# Patient Record
Sex: FEMALE | Race: WHITE | NOT HISPANIC OR LATINO | Employment: UNEMPLOYED | URBAN - NONMETROPOLITAN AREA
[De-identification: names, ages, dates, MRNs, and addresses within clinical notes are randomized per-mention and may not be internally consistent; named-entity substitution may affect disease eponyms.]

---

## 2023-05-30 ENCOUNTER — TELEPHONE (OUTPATIENT)
Dept: PRIMARY CARE | Facility: CLINIC | Age: 58
End: 2023-05-30

## 2023-05-30 NOTE — TELEPHONE ENCOUNTER
----- Message from Jocelin Love DO sent at 5/29/2023  9:11 PM EDT -----  Mammogram is normal.  Recheck in 1 year.     ----- Message -----  From: Chel Kellogg MA  Sent: 5/25/2023   3:16 PM EDT  To: Jocelin Love DO

## 2024-05-20 ENCOUNTER — TELEPHONE (OUTPATIENT)
Dept: PRIMARY CARE | Facility: CLINIC | Age: 59
End: 2024-05-20
Payer: COMMERCIAL

## 2024-05-20 DIAGNOSIS — T75.3XXD MOTION SICKNESS, SUBSEQUENT ENCOUNTER: ICD-10-CM

## 2024-05-20 DIAGNOSIS — Z00.00 HEALTHCARE MAINTENANCE: Primary | ICD-10-CM

## 2024-05-20 RX ORDER — SCOLOPAMINE TRANSDERMAL SYSTEM 1 MG/1
1 PATCH, EXTENDED RELEASE TRANSDERMAL
Qty: 10 PATCH | Refills: 0 | Status: SHIPPED | OUTPATIENT
Start: 2024-05-20 | End: 2024-07-19

## 2024-05-20 NOTE — TELEPHONE ENCOUNTER
"Pt has a trip planned for [facility] in late June. Pt will be taking 2 ferry trips. Pt states she gets \"sea sick\". Can you call her something in for the nausea. Pt uses [facility] in [facility].    Pt has not been seen since 2022. Would you like to see before you are on leave?  "

## 2024-05-20 NOTE — TELEPHONE ENCOUNTER
Sent in scopolamine patch for seasickness     Pls set her up for cpe in nov or dec labs ordered for before (have done a few days in advance)

## 2024-12-02 ENCOUNTER — APPOINTMENT (OUTPATIENT)
Dept: PRIMARY CARE | Facility: CLINIC | Age: 59
End: 2024-12-02
Payer: COMMERCIAL

## 2024-12-02 VITALS
SYSTOLIC BLOOD PRESSURE: 129 MMHG | HEART RATE: 60 BPM | OXYGEN SATURATION: 97 % | HEIGHT: 63 IN | DIASTOLIC BLOOD PRESSURE: 80 MMHG | TEMPERATURE: 97.4 F | WEIGHT: 130.6 LBS | BODY MASS INDEX: 23.14 KG/M2

## 2024-12-02 DIAGNOSIS — Z11.59 NEED FOR HEPATITIS C SCREENING TEST: ICD-10-CM

## 2024-12-02 DIAGNOSIS — Z23 IMMUNIZATION DUE: ICD-10-CM

## 2024-12-02 DIAGNOSIS — Z00.00 HEALTHCARE MAINTENANCE: Primary | ICD-10-CM

## 2024-12-02 DIAGNOSIS — E55.9 VITAMIN D DEFICIENCY: ICD-10-CM

## 2024-12-02 PROBLEM — R76.8 ANA POSITIVE: Status: ACTIVE | Noted: 2024-12-02

## 2024-12-02 PROBLEM — R20.0 NUMBNESS AND TINGLING: Status: RESOLVED | Noted: 2024-12-02 | Resolved: 2024-12-02

## 2024-12-02 PROBLEM — R41.3 AMNESIA: Status: RESOLVED | Noted: 2024-12-02 | Resolved: 2024-12-02

## 2024-12-02 PROBLEM — R20.2 NUMBNESS AND TINGLING: Status: RESOLVED | Noted: 2024-12-02 | Resolved: 2024-12-02

## 2024-12-02 PROBLEM — H00.019 HORDEOLUM EXTERNUM OF UPPER EYELID: Status: RESOLVED | Noted: 2024-12-02 | Resolved: 2024-12-02

## 2024-12-02 PROBLEM — N76.0 VAGINITIS: Status: RESOLVED | Noted: 2024-12-02 | Resolved: 2024-12-02

## 2024-12-02 PROCEDURE — 90715 TDAP VACCINE 7 YRS/> IM: CPT | Performed by: FAMILY MEDICINE

## 2024-12-02 PROCEDURE — 1036F TOBACCO NON-USER: CPT | Performed by: FAMILY MEDICINE

## 2024-12-02 PROCEDURE — 90471 IMMUNIZATION ADMIN: CPT | Performed by: FAMILY MEDICINE

## 2024-12-02 PROCEDURE — 3008F BODY MASS INDEX DOCD: CPT | Performed by: FAMILY MEDICINE

## 2024-12-02 PROCEDURE — 99396 PREV VISIT EST AGE 40-64: CPT | Performed by: FAMILY MEDICINE

## 2024-12-02 NOTE — ASSESSMENT & PLAN NOTE
Vaccines and screenings reviewed.  Questionnaires completed.  Health and wellness topics reviewed.  Diet and exercise recommendations revisited.  Routine blood work ordered today - to be done at patient's earliest convenience FASTING.    VACCINES:  -Flu vaccine is utd  -TDAP is recommended every 10 years, due per our records, updated in office today, good until 2034  -Shingles vaccine (Shingrix) is recommended. Please call insurance to see if covered. This vaccine is expensive but extremely effective. This is to be administered in 2 separate doses, 2- 6 months apart. This is a killed virus vaccine, so no risk of chicken pox or shingles with administration. The vaccine is approximately 90 % effective to protect against shingles even 5 years out. Side effects of the vaccine can include soreness of the arm at administration site and possible flu-like symptoms after administration. This is a strongly recommended vaccine.     SCREENINGS:  -Screening pap is utd 1/2024 via GYN  -Screening mammogram is utd 5/2024, repeat due 5/2025  -Screening colonoscopy is utd 12/2022, repeat as per GI  -Screening dexa recommended for ages 65+    LIFESTYLE MEASURES  -consider increasing protein intake provided no issues with kidneys to 1 gram per 1 pound of ideal body weight per not to exceed 150 gram per day. May have to supplement with a protein powder to achieve this goal.  -make sure you are avoiding refined carbs such as breads, pasta, cereal, candy, soda,  nutrition bars, granola, chips, and sugar sweetened beverages.      -eat 5- 7 servings daily of veggies,  healthy protein such as chicken, fish,  beans, and eggs, and include healthy fats in your diet such as seeds, nuts, olive oil, avocados, and salmon.   -exercise 4 - 6 days per week as you are able, 150 minutes total weekly divided up is recommended with 3-4 of those days including resistance/strength training.  -Vitamin D is recommended at 1000 - 5000 IU international units  daily.   -Always wear sunscreen when you have sun exposure.  -64 oz of water is recommended daily.  -Dental visits recommended every 6 months.  -Eye exam recommended every 2 years, for those with vision problems every year.

## 2024-12-02 NOTE — PROGRESS NOTES
Subjective   Patient ID: Aysha Salter is a 58 y.o. female who presents for Annual Exam (Pt presents for annual physical exam- ABN was given to pt and signed, pt verbalized understanding. Pt states no concerns/questions at this time. ).    HPI     Patient presents today for annual physical.  Patient is doing well overall, they have no new concerns or issues.     Reports still keeping logs of episodic numbness/tingling that has been occurring for years with extensive prior work-ups that were unremarkable. States will still happen periodically, typically in fingers. Was also diagnosed with Raynaud's in her 20s. History of positive SCARLET Not new or changed in presentation, no worsening.    Additionally patient notes she is currently participating in 20 year long Brain Study with [facility]. Study is for those with family history of dementia, alzheimer's, and MS. Her mom has MS but no family history of dementia or alzheimer's. For the study they do MRI, sleep studies every other year and neuro exam yearly. Will only notify if abnormalities she needs to act on, otherwise will not see these results.    WELLNESS VISIT   TDAP: none found  SHINGRIX: none found  PNEUMOVAX: n/a  PAP: 1/2024 - managed via GYN (Dr. Dumont)  MAMMO: 5/2025 - managed via GYN  CSCOPE: 12/2022(TA x 2, internal and external hemorrhoids)(grandmother with h/o colon cancer)   HEP C SCREEN: none found  CACS: 0 (low) in 11/2022     Patient has already had her influenza vaccine in Oct 2024.  Patient is agreeable to/declines Shingrix vaccine.  Patient is agreeable to/declines TDAP vaccine.    Diet: overall balanced  Exercise: regularly    Alcohol use: yes, on the weekends  Smoking: never smoker    Dental: utd  Vision: utd    Cervical cancer screening: utd via GYN  Denies family history in first degree relative.  Denies pelvic pain, vaginal discharge, or vaginal bleeding.    Breast cancer screening: utd  Reports family history in first degree relative -  "mother  Denies lumps/bumps, skin changes, nipple retraction, or nipple drainage.    Colon cancer screening: utd  Denies family history in first degree relative.  Denies melena, hematochezia, constipation, diarrhea, bloating, change in bowel habits.    Thyroid disorder screening:   Denies family history in first degree relative.  Denies heat or cold intolerance, diarrhea or constipation, coarsening of hair, and palpitations.     Cardiac disorder screenin/2022 CACS was 0  Denies family history in first degree relative.  Denies chest pain, SOB, palpitations, edema, dizziness.           Review of Systems   All other systems reviewed and are negative.      Objective   /80 (BP Location: Left arm, Patient Position: Sitting, BP Cuff Size: Adult)   Pulse 60   Temp 36.3 °C (97.4 °F) (Temporal)   Ht 1.594 m (5' 2.75\")   Wt 59.2 kg (130 lb 9.6 oz)   SpO2 97%   BMI 23.32 kg/m²     Physical Exam  Vitals and nursing note reviewed.   Constitutional:       General: She is not in acute distress.     Appearance: Normal appearance. She is not toxic-appearing.   HENT:      Head: Normocephalic and atraumatic.   Neck:      Thyroid: No thyromegaly.      Vascular: No hepatojugular reflux or JVD.   Cardiovascular:      Rate and Rhythm: Normal rate and regular rhythm.      Heart sounds: Normal heart sounds. No murmur heard.     No friction rub. No gallop.   Pulmonary:      Effort: Pulmonary effort is normal.      Breath sounds: Normal breath sounds. No wheezing, rhonchi or rales.   Abdominal:      General: Bowel sounds are normal. There is no distension.      Palpations: Abdomen is soft. There is no mass.      Tenderness: There is no abdominal tenderness. There is no guarding.   Musculoskeletal:      Right lower leg: No edema.      Left lower leg: No edema.   Lymphadenopathy:      Cervical: No cervical adenopathy.   Skin:     General: Skin is warm and dry.   Neurological:      General: No focal deficit present.      Mental " Status: She is alert and oriented to person, place, and time.   Psychiatric:         Mood and Affect: Mood normal.         Behavior: Behavior normal.         Assessment/Plan   Problem List Items Addressed This Visit             ICD-10-CM    Vitamin D deficiency E55.9     Vit d level mildly low in 2022 - was 16  Repeat vitamin D level ordered with routine labs today.  I recommend you start standard daily supplementation of vitamin D3 OTC 0706-4124 IU         Relevant Orders    Vitamin D 25-Hydroxy,Total (for eval of Vitamin D levels)    Healthcare maintenance - Primary Z00.00     Vaccines and screenings reviewed.  Questionnaires completed.  Health and wellness topics reviewed.  Diet and exercise recommendations revisited.  Routine blood work ordered today - to be done at patient's earliest convenience FASTING.    VACCINES:  -Flu vaccine is utd  -TDAP is recommended every 10 years, due per our records, updated in office today, good until 2034  -Shingles vaccine (Shingrix) is recommended. Please call insurance to see if covered. This vaccine is expensive but extremely effective. This is to be administered in 2 separate doses, 2- 6 months apart. This is a killed virus vaccine, so no risk of chicken pox or shingles with administration. The vaccine is approximately 90 % effective to protect against shingles even 5 years out. Side effects of the vaccine can include soreness of the arm at administration site and possible flu-like symptoms after administration. This is a strongly recommended vaccine.     SCREENINGS:  -Screening pap is utd 1/2024 via GYN  -Screening mammogram is utd 5/2024, repeat due 5/2025  -Screening colonoscopy is utd 12/2022, repeat as per GI  -Screening dexa recommended for ages 65+    LIFESTYLE MEASURES  -consider increasing protein intake provided no issues with kidneys to 1 gram per 1 pound of ideal body weight per not to exceed 150 gram per day. May have to supplement with a protein powder to  achieve this goal.  -make sure you are avoiding refined carbs such as breads, pasta, cereal, candy, soda,  nutrition bars, granola, chips, and sugar sweetened beverages.      -eat 5- 7 servings daily of veggies,  healthy protein such as chicken, fish,  beans, and eggs, and include healthy fats in your diet such as seeds, nuts, olive oil, avocados, and salmon.   -exercise 4 - 6 days per week as you are able, 150 minutes total weekly divided up is recommended with 3-4 of those days including resistance/strength training.  -Vitamin D is recommended at 1000 - 5000 IU international units daily.   -Always wear sunscreen when you have sun exposure.  -64 oz of water is recommended daily.  -Dental visits recommended every 6 months.  -Eye exam recommended every 2 years, for those with vision problems every year.           Relevant Orders    Comprehensive Metabolic Panel    CBC    Lipid Panel     Other Visit Diagnoses         Codes    Body mass index (BMI) of 23.0 to 23.9 in adult     Z68.23    Need for hepatitis C screening test     Z11.59    Relevant Orders    Hepatitis C Antibody    Immunization due     Z23            Follow-up in 1 year for annual physical.   Call for sooner follow-up if needed.         Scribe Attestation  By signing my name below, IJuli Scribe   attest that this documentation has been prepared under the direction and in the presence of Jocelin Love DO.

## 2024-12-02 NOTE — PATIENT INSTRUCTIONS
Lifestyle recommendations for overall wellness, as you are interested or able -     We recommend limitation of refined carbohydrates such as pasta, pretzels, chips, breads, bagels or cereals- particularly white flour containing.   Limit sugar sweetened foods or beverages, alcohol, pastries, candy, sports drinks or sodas.  (Water is best.)  Minimize diet drinks with artificial sugars.  If craving a sweetened food or beverage, two sweeteners from natural sources without substantial calories or glucose-raising properties are monk fruit extract or stevia.     Shoot for drinking 64 oz water daily unless conditions such as heart failure limit your recommended intake (Ask your doctor if you're not sure.)    Whole grain foods can be part of a healthy diet, and include such things as steel cut oats, brown rice, quinoa, millet, Joe or other sprouted breads (often found in the freezer section), amaranth, teff, farrow., etc.  Wheat/buckwheat/spelt can be added if you are not gluten-sensitive, and if these foods don't cause you bloating or symptoms of inflammation.       We do recommend eating lots of vegetables- 5 -7 servings of veggies daily, which is good fiber source as well as vitamins and minerals. (Think egg bake with spinach, peppers, onions, for breakfast,  celery with nut butter, or hummus and cucumbers as snacks,  salads with lunch and/or dinner, 1 - 2 veggies with lunch and or dinner,  etc)    Eat lean proteins -shoot for 70 - 100 g of protein per day unless you have restrictions from kidney disease, etc.  Think hard boiled eggs, beans, seeds/lentils, cottage cheese, consider protein powder such as whey or pea powder in steel cut oats or a homemade smoothie.     Eat some healthy fat daily, such as a handful of almonds, walnuts, pumpkin seeds, a serving of salmon, a splash of olive oil on your salad, an avocado.       Healthy nutritional choices decrease conditions like elevated cholesterol, elevated sugars,  elevated weight, elevated blood pressure, and elevated inflammation.    Healthy choices can also lower risk of events such as strokes, heart attacks, and cancer.     Make most of your food intake plant- based.   Have occasional treats if you like, but try not to overindulge.     Some sort of heart-rate increasing movement or exercise is recommended 4 - 6 days per week, even if in 5 or 10 min increments several times throughout the day.     Include within that exercise time some strength/ resistance exercises at least 3 days per week.  This can be through use of resistance bands, free weights, machines, body weight lifting, heavy outdoor chores such as yardwork, mulching, chopping wood, etc.   Stretching/range of motion exercises should also be included as part of your exercise time - such as yoga, julia chi, or even just post- exercise stretching.  Moving exercised muscles beyond day to day usual activities can help you stay limber and flexible, decrease injury risk, and minimize aches and pains with everyday movements.     Either in combination with exercise or just as a self-care quiet practice, spending time in nature has a wide range of positive effects on your health and wellbeing, including improved mood, improved blood pressure, and improved immune function.   Even 10 - 20 minutes a few days per week can make a difference.     Consider visiting [email or web address]     This site is a new partnership between [facility] and local physicians to include a prescription for nature as part of your self-care and wellness.      Healthy sleep ( 6 - 8 hours if possible) and  involvement in community (neighbors, family, senior center, hobby groups, mitch based realationships, etc) are also important parts of overall well being.      Pick one or two things to focus on per week or month and start building on your wellness promoting activities.   You deserve it!     Shingles series and tdap at  pharmacy    Vitamin D  - was deficient in past,   check today     Recommend min 1000 intl vit D3 min daily,  up to 5000 international units in winter     Vitamin D deficiency  Vit d level mildly low in 2022 - was 16  Repeat vitamin D level ordered with routine labs today.  I recommend you start standard daily supplementation of vitamin D3 OTC 8306-8346 IU    Healthcare maintenance  Vaccines and screenings reviewed.  Questionnaires completed.  Health and wellness topics reviewed.  Diet and exercise recommendations revisited.  Routine blood work ordered today - to be done at patient's earliest convenience FASTING.    VACCINES:  -Flu vaccine is utd  -TDAP is recommended every 10 years, due per our records, updated in office today, good until 2034  -Shingles vaccine (Shingrix) is recommended. Please call insurance to see if covered. This vaccine is expensive but extremely effective. This is to be administered in 2 separate doses, 2- 6 months apart. This is a killed virus vaccine, so no risk of chicken pox or shingles with administration. The vaccine is approximately 90 % effective to protect against shingles even 5 years out. Side effects of the vaccine can include soreness of the arm at administration site and possible flu-like symptoms after administration. This is a strongly recommended vaccine.     SCREENINGS:  -Screening pap is utd 1/2024 via GYN  -Screening mammogram is utd 5/2024, repeat due 5/2025  -Screening colonoscopy is utd 12/2022, repeat as per GI  -Screening dexa recommended for ages 65+    LIFESTYLE MEASURES  -consider increasing protein intake provided no issues with kidneys to 1 gram per 1 pound of ideal body weight per not to exceed 150 gram per day. May have to supplement with a protein powder to achieve this goal.  -make sure you are avoiding refined carbs such as breads, pasta, cereal, candy, soda,  nutrition bars, granola, chips, and sugar sweetened beverages.      -eat 5- 7 servings daily of  veggies,  healthy protein such as chicken, fish,  beans, and eggs, and include healthy fats in your diet such as seeds, nuts, olive oil, avocados, and salmon.   -exercise 4 - 6 days per week as you are able, 150 minutes total weekly divided up is recommended with 3-4 of those days including resistance/strength training.  -Vitamin D is recommended at 1000 - 5000 IU international units daily.   -Always wear sunscreen when you have sun exposure.  -64 oz of water is recommended daily.  -Dental visits recommended every 6 months.  -Eye exam recommended every 2 years, for those with vision problems every year.         Hire a coordinator for your mom's needs,   or make a spread sheet and hire out for tasks she wants.  Get a / uber person you trust,   set up  a schedule     Add in resistance training of some sort min 3 x per week, even if only for 15 min or so!          Follow up with me if you are able -   we should discuss sleep,   hormones, etc!!!!

## 2024-12-02 NOTE — ASSESSMENT & PLAN NOTE
Vit d level mildly low in 2022 - was 16  Repeat vitamin D level ordered with routine labs today.  I recommend you start standard daily supplementation of vitamin D3 OTC 7029-2003 IU

## 2025-05-02 ENCOUNTER — APPOINTMENT (OUTPATIENT)
Dept: PRIMARY CARE | Facility: CLINIC | Age: 60
End: 2025-05-02
Payer: COMMERCIAL

## 2025-12-08 ENCOUNTER — APPOINTMENT (OUTPATIENT)
Dept: PRIMARY CARE | Facility: CLINIC | Age: 60
End: 2025-12-08
Payer: COMMERCIAL